# Patient Record
Sex: FEMALE | Race: WHITE | NOT HISPANIC OR LATINO | URBAN - METROPOLITAN AREA
[De-identification: names, ages, dates, MRNs, and addresses within clinical notes are randomized per-mention and may not be internally consistent; named-entity substitution may affect disease eponyms.]

---

## 2019-01-14 ENCOUNTER — OFFICE (OUTPATIENT)
Dept: URBAN - METROPOLITAN AREA CLINIC 78 | Facility: CLINIC | Age: 59
End: 2019-01-14

## 2019-01-14 VITALS
HEART RATE: 68 BPM | SYSTOLIC BLOOD PRESSURE: 177 MMHG | HEIGHT: 60 IN | WEIGHT: 147 LBS | TEMPERATURE: 98.9 F | DIASTOLIC BLOOD PRESSURE: 112 MMHG

## 2019-01-14 DIAGNOSIS — K22.70 BARRETT'S ESOPHAGUS WITHOUT DYSPLASIA: ICD-10-CM

## 2019-01-14 DIAGNOSIS — K21.9 GASTRO-ESOPHAGEAL REFLUX DISEASE WITHOUT ESOPHAGITIS: ICD-10-CM

## 2019-01-14 DIAGNOSIS — Z83.71 FAMILY HISTORY OF COLONIC POLYPS: ICD-10-CM

## 2019-01-14 DIAGNOSIS — Z12.11 ENCOUNTER FOR SCREENING FOR MALIGNANT NEOPLASM OF COLON: ICD-10-CM

## 2019-01-14 DIAGNOSIS — R14.2 ERUCTATION: ICD-10-CM

## 2019-01-14 PROCEDURE — 99243 OFF/OP CNSLTJ NEW/EST LOW 30: CPT

## 2019-01-14 NOTE — SERVICEHPINOTES
MASTER EDGE   is a   58   year old    female who is being seen in consultation at the request of   TAMRA SALAZAR   for OV prior to colonoscopy given no prior CRC screening. She is also overdue for repeat EGD given h/o bx barrette's esophagus found in 07/2010. She has h/o GERD but no on any PPI. Last use of PPI was Nexium 40 mg x 1-2 weeks s/p EGD in 2010. She mentions epigastric pain described as "pressure" along with excessive "belching" that relieved the pressure. She has tried Tums prn that helps. She was advised to try Pepcid AC by PCP that she had not done. She has DMT2 controlled with metformin. She has daily BMs 1-2x/day, BSS type 4. She is a chronic smoker x 25 yrs 10 cigarettes daily. NSAID use includes daily ASA 81 mg. FM h/o colonic polyps in father. No fm h/o CRC. Denies fevers, chest pain, n/v, regurgitation, dysphagia, odynophagia, change in appetite, change in bowel habits, diarrhea, rectal bleeding, melena, weight loss. BR

## 2019-01-17 ENCOUNTER — OFFICE (OUTPATIENT)
Dept: URBAN - METROPOLITAN AREA PATHOLOGY 17 | Facility: PATHOLOGY | Age: 59
End: 2019-01-17

## 2019-01-17 ENCOUNTER — OFFICE (OUTPATIENT)
Dept: URBAN - METROPOLITAN AREA CLINIC 32 | Facility: CLINIC | Age: 59
End: 2019-01-17

## 2019-01-17 VITALS
RESPIRATION RATE: 14 BRPM | DIASTOLIC BLOOD PRESSURE: 65 MMHG | OXYGEN SATURATION: 99 % | SYSTOLIC BLOOD PRESSURE: 162 MMHG | RESPIRATION RATE: 20 BRPM | SYSTOLIC BLOOD PRESSURE: 163 MMHG | TEMPERATURE: 98.1 F | SYSTOLIC BLOOD PRESSURE: 114 MMHG | HEART RATE: 56 BPM | RESPIRATION RATE: 9 BRPM | DIASTOLIC BLOOD PRESSURE: 86 MMHG | DIASTOLIC BLOOD PRESSURE: 69 MMHG | OXYGEN SATURATION: 96 % | HEIGHT: 60 IN | RESPIRATION RATE: 16 BRPM | HEART RATE: 59 BPM | HEART RATE: 73 BPM | DIASTOLIC BLOOD PRESSURE: 54 MMHG | HEART RATE: 68 BPM | TEMPERATURE: 98.3 F | DIASTOLIC BLOOD PRESSURE: 112 MMHG | OXYGEN SATURATION: 98 % | WEIGHT: 147 LBS | SYSTOLIC BLOOD PRESSURE: 128 MMHG | SYSTOLIC BLOOD PRESSURE: 100 MMHG | HEART RATE: 70 BPM

## 2019-01-17 DIAGNOSIS — K21.9 GASTRO-ESOPHAGEAL REFLUX DISEASE WITHOUT ESOPHAGITIS: ICD-10-CM

## 2019-01-17 DIAGNOSIS — K22.70 BARRETT'S ESOPHAGUS WITHOUT DYSPLASIA: ICD-10-CM

## 2019-01-17 DIAGNOSIS — R14.2 ERUCTATION: ICD-10-CM

## 2019-01-17 PROBLEM — K20.9 ESOPHAGITIS, UNSPECIFIED: Status: ACTIVE | Noted: 2019-01-17

## 2019-01-17 PROCEDURE — 43239 EGD BIOPSY SINGLE/MULTIPLE: CPT

## 2019-01-17 PROCEDURE — 88313 SPECIAL STAINS GROUP 2: CPT

## 2019-01-17 PROCEDURE — 88312 SPECIAL STAINS GROUP 1: CPT

## 2019-01-17 PROCEDURE — 88305 TISSUE EXAM BY PATHOLOGIST: CPT

## 2019-01-17 RX ORDER — OMEPRAZOLE 20 MG/1
CAPSULE, DELAYED RELEASE ORAL
Qty: 90 | Refills: 3 | Status: ACTIVE
Start: 2019-01-17

## 2019-04-22 ENCOUNTER — OFFICE (OUTPATIENT)
Dept: URBAN - METROPOLITAN AREA CLINIC 78 | Facility: CLINIC | Age: 59
End: 2019-04-22

## 2019-04-22 VITALS
HEIGHT: 60 IN | SYSTOLIC BLOOD PRESSURE: 180 MMHG | TEMPERATURE: 97.2 F | HEART RATE: 65 BPM | DIASTOLIC BLOOD PRESSURE: 101 MMHG | WEIGHT: 148 LBS

## 2019-04-22 DIAGNOSIS — K22.70 BARRETT'S ESOPHAGUS WITHOUT DYSPLASIA: ICD-10-CM

## 2019-04-22 DIAGNOSIS — K21.9 GASTRO-ESOPHAGEAL REFLUX DISEASE WITHOUT ESOPHAGITIS: ICD-10-CM

## 2019-04-22 DIAGNOSIS — Z83.71 FAMILY HISTORY OF COLONIC POLYPS: ICD-10-CM

## 2019-04-22 PROCEDURE — 99214 OFFICE O/P EST MOD 30 MIN: CPT

## 2019-04-22 RX ORDER — OMEPRAZOLE 20 MG/1
CAPSULE, DELAYED RELEASE ORAL
Qty: 90 | Refills: 3 | Status: ACTIVE
Start: 2019-01-17

## 2019-04-22 NOTE — SERVICEHPINOTES
MASTER EDGE   is a   58  female who is here for f/u to EGD for h/o Kelsey's esophagus. Her recent EGD 01/2019 found + BE without dysplasia and recall 3 yrs. Since last visit she was advised to restart Omeprazole 20 mg qd due to h/o BE which she had been taking since discussed last month. She wants rx refill for PPI. She expressed frustration with our office concerning scheduling so she says was not able to have colonoscopy as discussed at prior visit (Notified our [facility]  to help look into this case and reach out to patient). She has DMT2 controlled with metformin. She has daily BMs 1-2x/day, BSS type 4. She is a chronic smoker x 25 yrs 10 cigarettes daily. NSAID use includes daily ASA 81 mg. FM h/o colonic polyps in father. No fm h/o CRC. Denies fevers, chest pain, n/v, regurgitation, dysphagia, odynophagia, change in appetite, change in bowel habits, diarrhea, rectal bleeding, melena, weight loss. No other complaints. BR

## 2021-09-02 ENCOUNTER — APPOINTMENT (RX ONLY)
Dept: URBAN - METROPOLITAN AREA CLINIC 42 | Facility: CLINIC | Age: 61
Setting detail: DERMATOLOGY
End: 2021-09-02

## 2021-09-02 DIAGNOSIS — R23.1 PALLOR: ICD-10-CM

## 2021-09-02 PROCEDURE — ? COUNSELING

## 2021-09-02 PROCEDURE — ? TREATMENT REGIMEN

## 2021-09-02 PROCEDURE — 99202 OFFICE O/P NEW SF 15 MIN: CPT

## 2021-09-02 NOTE — HPI: RASH
Is This A New Presentation, Or A Follow-Up?: Rash
Additional History: States not sure correlation but recently pcp changed losartan and was given water pill and then noticed blotchy skin.

## 2021-09-02 NOTE — PROCEDURE: TREATMENT REGIMEN
Plan: Blood work slip given for JOHNNY, sjögren’s, lupus anticoagulant ICD-10 R231. Had full panel bloodwork by pcp within past month. Will obtain for future review.\\nDiscussed with patient possibly from weather changes. See if correlation.
Detail Level: Zone

## 2025-08-14 ENCOUNTER — TELEHEALTH PROVIDED IN PATIENT'S HOME (OUTPATIENT)
Dept: URBAN - METROPOLITAN AREA TELEHEALTH 7 | Facility: TELEHEALTH | Age: 65
End: 2025-08-14
Payer: COMMERCIAL

## 2025-08-14 VITALS — HEIGHT: 60 IN | WEIGHT: 157 LBS

## 2025-08-14 DIAGNOSIS — K22.70 BARRETT'S ESOPHAGUS WITHOUT DYSPLASIA: ICD-10-CM

## 2025-08-14 DIAGNOSIS — Z12.11 ENCOUNTER FOR SCREENING FOR MALIGNANT NEOPLASM OF COLON: ICD-10-CM

## 2025-08-14 DIAGNOSIS — K62.5 HEMORRHAGE OF ANUS AND RECTUM: ICD-10-CM

## 2025-08-14 DIAGNOSIS — K21.9 GASTRO-ESOPHAGEAL REFLUX DISEASE WITHOUT ESOPHAGITIS: ICD-10-CM

## 2025-08-14 PROCEDURE — 99204 OFFICE O/P NEW MOD 45 MIN: CPT | Mod: 95 | Performed by: PHYSICIAN ASSISTANT
